# Patient Record
Sex: FEMALE
[De-identification: names, ages, dates, MRNs, and addresses within clinical notes are randomized per-mention and may not be internally consistent; named-entity substitution may affect disease eponyms.]

---

## 2018-02-09 ENCOUNTER — HOSPITAL ENCOUNTER (EMERGENCY)
Dept: HOSPITAL 31 - C.ER | Age: 78
End: 2018-02-09
Payer: MEDICARE

## 2018-02-09 VITALS — TEMPERATURE: 96 F

## 2018-02-09 VITALS — BODY MASS INDEX: 22.4 KG/M2

## 2018-02-09 DIAGNOSIS — M81.0: ICD-10-CM

## 2018-02-09 DIAGNOSIS — E86.0: ICD-10-CM

## 2018-02-09 DIAGNOSIS — T68.XXXA: ICD-10-CM

## 2018-02-09 DIAGNOSIS — E87.2: ICD-10-CM

## 2018-02-09 DIAGNOSIS — N17.9: ICD-10-CM

## 2018-02-09 DIAGNOSIS — J18.9: ICD-10-CM

## 2018-02-09 DIAGNOSIS — I10: ICD-10-CM

## 2018-02-09 DIAGNOSIS — A41.9: Primary | ICD-10-CM

## 2018-02-09 LAB
ALBUMIN SERPL-MCNC: 3 G/DL (ref 3.5–5)
ALBUMIN/GLOB SERPL: 0.9 {RATIO} (ref 1–2.1)
ALT SERPL-CCNC: 572 U/L (ref 9–52)
ANISOCYTOSIS BLD QL SMEAR: SLIGHT
APTT BLD: 41 SECONDS (ref 21–34)
ARTERIAL BLOOD GAS O2 SAT: 100.6 % (ref 95–98)
ARTERIAL BLOOD GAS PCO2: 27 MM/HG (ref 35–45)
ARTERIAL BLOOD GAS TCO2: 7.2 MMOL/L (ref 22–28)
AST SERPL-CCNC: 1030 U/L (ref 14–36)
BACTERIA #/AREA URNS HPF: (no result) /[HPF]
BASE EXCESS BLDV CALC-SCNC: -20 MMOL/L (ref 0–2)
BASOPHILS # BLD AUTO: 0 K/UL (ref 0–0.2)
BASOPHILS NFR BLD: 0.2 % (ref 0–2)
BILIRUB UR-MCNC: NEGATIVE MG/DL
BUN SERPL-MCNC: 62 MG/DL (ref 7–17)
BURR CELLS BLD QL SMEAR: SLIGHT
CALCIUM SERPL-MCNC: 9 MG/DL (ref 8.6–10.4)
CK MB SERPL-MCNC: 9.85 NG/ML (ref 0–3.38)
EOSINOPHIL # BLD AUTO: 0 K/UL (ref 0–0.7)
EOSINOPHIL NFR BLD: 0.1 % (ref 0–4)
ERYTHROCYTE [DISTWIDTH] IN BLOOD BY AUTOMATED COUNT: 17.5 % (ref 11.5–14.5)
GFR NON-AFRICAN AMERICAN: 16
GLUCOSE UR STRIP-MCNC: NORMAL MG/DL
HCO3 BLDA-SCNC: 6.2 MMOL/L (ref 21–28)
HGB BLD-MCNC: 11.7 G/DL (ref 11–16)
HYALINE CASTS #/AREA URNS LPF: (no result) /LPF (ref 0–2)
HYPOCHROMIC: SLIGHT
INHALED O2 CONCENTRATION: 100 %
INR PPP: 1.7
LEUKOCYTE ESTERASE UR-ACNC: (no result) LEU/UL
LG PLATELETS BLD QL SMEAR: PRESENT
LYMPHOCYTE: 8 % (ref 20–40)
LYMPHOCYTES # BLD AUTO: 0.4 K/UL (ref 1–4.3)
LYMPHOCYTES NFR BLD AUTO: 9.1 % (ref 20–40)
MCH RBC QN AUTO: 24.6 PG (ref 27–31)
MCHC RBC AUTO-ENTMCNC: 31.3 G/DL (ref 33–37)
MCV RBC AUTO: 78.5 FL (ref 81–99)
MONOCYTE: 5 % (ref 0–10)
MONOCYTES # BLD: 0 K/UL (ref 0–0.8)
MONOCYTES NFR BLD: 0.5 % (ref 0–10)
NEUTROPHILS # BLD: 4.3 K/UL (ref 1.8–7)
NEUTROPHILS NFR BLD AUTO: 35 % (ref 50–75)
NEUTROPHILS NFR BLD AUTO: 90.1 % (ref 50–75)
NEUTS BAND NFR BLD: 51 % (ref 0–2)
NRBC BLD AUTO-RTO: 0.3 % (ref 0–2)
OVALOCYTES BLD QL SMEAR: SLIGHT
PCO2 BLDV: 54 MMHG (ref 40–60)
PH BLDA: 6.98 [PH] (ref 7.35–7.45)
PH BLDV: 6.96 [PH] (ref 7.32–7.43)
PH UR STRIP: 5 [PH] (ref 5–8)
PLATELET # BLD EST: NORMAL 10*3/UL
PLATELET # BLD: 178 K/UL (ref 130–400)
PMV BLD AUTO: 9.8 FL (ref 7.2–11.7)
PO2 BLDA: 409 MM/HG (ref 80–100)
POIKILOCYTOSIS BLD QL SMEAR: SLIGHT
PROT UR STRIP-MCNC: (no result) MG/DL
PROTHROMBIN TIME: 18.9 SECONDS (ref 9.7–12.2)
RBC # BLD AUTO: 4.75 MIL/UL (ref 3.8–5.2)
RBC # UR STRIP: NEGATIVE /UL
SP GR UR STRIP: 1.02 (ref 1–1.03)
SQUAMOUS EPITHIAL: 1 /HPF (ref 0–5)
TEARDROP CELLS: SLIGHT
TOTAL CELLS COUNTED BLD: 100
TROPONIN I SERPL-MCNC: 0.21 NG/ML (ref 0–0.12)
URINE AMORPHOUS SEDIMENT: (no result) /UL
URINE NITRATE: NEGATIVE
UROBILINOGEN UR-MCNC: NORMAL MG/DL (ref 0.2–1)
VARIANT LYMPHS NFR BLD MANUAL: 1 % (ref 0–0)
VENOUS BLOOD GAS PO2: 47 MM/HG (ref 30–55)
WBC # BLD AUTO: 4.8 K/UL (ref 4.8–10.8)

## 2018-02-09 PROCEDURE — 83880 ASSAY OF NATRIURETIC PEPTIDE: CPT

## 2018-02-09 PROCEDURE — 84484 ASSAY OF TROPONIN QUANT: CPT

## 2018-02-09 PROCEDURE — 71045 X-RAY EXAM CHEST 1 VIEW: CPT

## 2018-02-09 PROCEDURE — 94660 CPAP INITIATION&MGMT: CPT

## 2018-02-09 PROCEDURE — 80053 COMPREHEN METABOLIC PANEL: CPT

## 2018-02-09 PROCEDURE — 92950 HEART/LUNG RESUSCITATION CPR: CPT

## 2018-02-09 PROCEDURE — 82803 BLOOD GASES ANY COMBINATION: CPT

## 2018-02-09 PROCEDURE — 85610 PROTHROMBIN TIME: CPT

## 2018-02-09 PROCEDURE — 82948 REAGENT STRIP/BLOOD GLUCOSE: CPT

## 2018-02-09 PROCEDURE — 36600 WITHDRAWAL OF ARTERIAL BLOOD: CPT

## 2018-02-09 PROCEDURE — 93005 ELECTROCARDIOGRAM TRACING: CPT

## 2018-02-09 PROCEDURE — 87040 BLOOD CULTURE FOR BACTERIA: CPT

## 2018-02-09 PROCEDURE — 99291 CRITICAL CARE FIRST HOUR: CPT

## 2018-02-09 PROCEDURE — 85025 COMPLETE CBC W/AUTO DIFF WBC: CPT

## 2018-02-09 PROCEDURE — 96361 HYDRATE IV INFUSION ADD-ON: CPT

## 2018-02-09 PROCEDURE — 85730 THROMBOPLASTIN TIME PARTIAL: CPT

## 2018-02-09 PROCEDURE — 96375 TX/PRO/DX INJ NEW DRUG ADDON: CPT

## 2018-02-09 PROCEDURE — 87086 URINE CULTURE/COLONY COUNT: CPT

## 2018-02-09 PROCEDURE — 81001 URINALYSIS AUTO W/SCOPE: CPT

## 2018-02-09 PROCEDURE — 96365 THER/PROPH/DIAG IV INF INIT: CPT

## 2018-02-09 NOTE — C.PDOC
History Of Present Illness


Pt was found lethargic in her bedroom. The room's condition is very poor 

according to EMS. EMS found pt to be hypoglycemic (FS 50) and was given D50 en 

route without response. 


Time Seen by Provider: 18 07:22


Chief Complaint (Nursing): Altered Mental Status


History Per: EMS


History/Exam Limitations: Clinical Condition


Onset/Duration Of Symptoms: Unknown


Onset Of Symptoms: Cannot Confirm Onset


Current Symptoms Are (Timing): Still Present


Usual Baseline: Unknown


Exacerbating Factor(s): Unknown


Severity: Severe


Additional History Per: Prior Records


Associated Symptoms: Weakness, Other (Lethargy)





Past Medical History


Reviewed: Historical Data, Nursing Documentation, Vital Signs


Vital Signs: 





 Last Vital Signs











Temp  95.5 F L  18 10:04


 


Pulse  133 H  18 10:04


 


Resp  26 H  18 10:04


 


BP  146/80   18 10:04


 


Pulse Ox  100   18 10:04














- Medical History


PMH: Asthma, HTN, Osteoporosis


Family History: States: Unknown Family Hx





- Social History


Hx Alcohol Use: No (unknown)


Hx Substance Use: No





- Immunization History


Hx Tetanus Toxoid Vaccination: No (unknown)


Hx Influenza Vaccination: No (unknown)


Hx Pneumococcal Vaccination: No ([place]nown)





Review Of Systems


Review Of Systems: ROS cannot be obtained secondary to pt's inabilty to answer 

questions.





Physical Exam





- Physical Exam


Appears: Toxic, In Acute Distress, Other (Lethargic)


Skin: Dry, Mottled


Head: Atraumatic


Oral Mucosa: Dry


Neck: Normal ROM, Supple


Cardiovascular: Rhythm Regular (tachycardia)


Respiratory: Rhonchi, Other (tachypnea)


Gastrointestinal/Abdominal: Soft, No Tenderness, No Distention


Extremity: Normal ROM, No Pedal Edema


Neurological/Psych: No Response To Commands


Pain Response: Withdraws With Pain





ED Course And Treatment





- Laboratory Results


Result Diagrams: 


 18 07:52





 18 07:52


Lab Interpretation: Abnormal


Interpretation Of Abnormal: Acidosis. Acute renal failure. Bandemia.


ECG: Interpreted By Me, Viewed By Me


ECG Rhythm: Nonspecific Changes


Interpretation Of ECG: Indeterminate rhythm due to many artifacts. Tachycardia.


Rate From EC


O2 Sat by Pulse Oximetry: 100 (on BiPAP)





- Radiology


CXR: Viewed By Me, Read By Radiologist


CXR Interpretation: Yes: Infiltrates (RLL pneumonia)


Progress Note: Pt was placed on BiPAP and Bear hugger upon arrival with modest 

improvement.





Progress





- Interventions


Interventions:: Observation, Intravenous fluid, Oxygen





- Medications Administered


Intravenous: Other (Abx., Sodium Bicarb.)





- Data Reviewed


Data Reviewed: Lab, Diagnostic imaging, EKG, Old records





- Patient Status


Patient status: Partially improved, Critical





- Critical Care


Citical Care: Excluding Proc Time


Critical Care Time: 75 minutes





- Continuity of Care


Discussed patient case with:: ED Nurse, On-call PMD-pt unassigned


Discussed pt. case with consultant/specialty: Pulmonary/Crit. Care





- Patient Plan


Patient Plan: Admission, ICU





Disposition


Discussed With : Sophy Foster


Comment: He accepted pt on his service.


Doctor Will See Patient In The: Hospital





- Disposition


Disposition: HOSPITALIZED


Disposition Time: 10:54


Condition: CRITICAL





- Clinical Impression


Clinical Impression: 


 Lactic acidosis, Hypothermia, Dehydration, Pneumonia, Sepsis, ARF (acute renal 

failure)

## 2018-02-09 NOTE — CP.PCM.DIS
Provider





- Provider


Date of Admission: 


02/09/18 11:01





Attending physician: 


Sophy Foster MD





Time Spent in preparation of Discharge (in minutes): 20





Hospital Course





- Lab Results


Lab Results: 


 Most Recent Lab Values











WBC  4.8 K/uL (4.8-10.8)   02/09/18  07:52    


 


RBC  4.75 Mil/uL (3.80-5.20)   02/09/18  07:52    


 


Hgb  11.7 g/dL (11.0-16.0)   02/09/18  07:52    


 


Hct  37.3 % (34.0-47.0)   02/09/18  07:52    


 


MCV  78.5 fL (81.0-99.0)  L  02/09/18  07:52    


 


MCH  24.6 pg (27.0-31.0)  L  02/09/18  07:52    


 


MCHC  31.3 g/dL (33.0-37.0)  L  02/09/18  07:52    


 


RDW  17.5 % (11.5-14.5)  H  02/09/18  07:52    


 


Plt Count  178 K/uL (130-400)   02/09/18  07:52    


 


MPV  9.8 fL (7.2-11.7)   02/09/18  07:52    


 


Neut % (Auto)  90.1 % (50.0-75.0)  H  02/09/18  07:52    


 


Lymph % (Auto)  9.1 % (20.0-40.0)  L  02/09/18  07:52    


 


Mono % (Auto)  0.5 % (0.0-10.0)   02/09/18  07:52    


 


Eos % (Auto)  0.1 % (0.0-4.0)   02/09/18  07:52    


 


Baso % (Auto)  0.2 % (0.0-2.0)   02/09/18  07:52    


 


Neut # (Auto)  4.3 K/uL (1.8-7.0)   02/09/18  07:52    


 


Lymph # (Auto)  0.4 K/uL (1.0-4.3)  L  02/09/18  07:52    


 


Mono # (Auto)  0.0 K/uL (0.0-0.8)   02/09/18  07:52    


 


Eos # (Auto)  0.0 K/uL (0.0-0.7)   02/09/18  07:52    


 


Baso # (Auto)  0.0 K/uL (0.0-0.2)   02/09/18  07:52    


 


Neutrophils % (Manual)  35 % (50-75)  L  02/09/18  07:52    


 


Band Neutrophils %  51 % (0-2)  H*  02/09/18  07:52    


 


Lymphocytes % (Manual)  8 % (20-40)  L  02/09/18  07:52    


 


Reactive Lymphs %  1 % (0-0)  H  02/09/18  07:52    


 


Monocytes % (Manual)  5 % (0-10)   02/09/18  07:52    


 


Platelet Estimate  Normal  (NORMAL)   02/09/18  07:52    


 


Large Platelets  Present   02/09/18  07:52    


 


Hypochromasia (manual)  Slight   02/09/18  07:52    


 


Poikilocytosis (manual  Slight   02/09/18  07:52    


 


Anisocytosis (manual)  Slight   02/09/18  07:52    


 


Tear Drop Cells  Slight   02/09/18  07:52    


 


Ovalocytes  Slight   02/09/18  07:52    


 


Reed Cells  Slight   02/09/18  07:52    


 


PT  18.9 SECONDS (9.7-12.2)  H  02/09/18  07:52    


 


INR  1.7   02/09/18  07:52    


 


APTT  41 SECONDS (21-34)  H  02/09/18  07:52    


 


Puncture Site  Lf   02/09/18  09:00    


 


pCO2  27 mm/Hg (35-45)  L  02/09/18  09:00    


 


pO2  409 mm/Hg ()  H  02/09/18  09:00    


 


HCO3  6.2 mmol/L (21-28)  L*  02/09/18  09:00    


 


ABG pH  6.98  (7.35-7.45)  L*  02/09/18  09:00    


 


ABG Total CO2  7.2 mmol/L (22-28)  L  02/09/18  09:00    


 


ABG O2 Saturation  100.6 % (95-98)  H  02/09/18  09:00    


 


ABG Base Excess  -24.1 mmol/L (-2.0-3.0)  L  02/09/18  09:00    


 


Juan Test  Na   02/09/18  09:00    


 


ABG Potassium  4.5 mmol/L (3.6-5.2)   02/09/18  09:00    


 


VBG pH  6.96  (7.32-7.43)  L*  02/09/18  08:10    


 


VBG pCO2  54 mmHg (40-60)   02/09/18  08:10    


 


VBG HCO3  8.1 mmol/L  02/09/18  08:10    


 


VBG Total CO2  13.8 mmol/L (22-28)  L  02/09/18  08:10    


 


VBG O2 Sat (Calc)  66.0 % (40-65)  H  02/09/18  08:10    


 


VBG Base Excess  -20.0 mmol/L (0.0-2.0)  L  02/09/18  08:10    


 


VBG Potassium  4.9 mmol/L (3.6-5.2)   02/09/18  08:10    


 


A-a O2 Difference  270.0 mm/Hg  02/09/18  09:00    


 


Respiratory Index  0.7   02/09/18  09:00    


 


Sodium  136.0 mmol/l (132-148)   02/09/18  09:00    


 


Chloride  108.0 mmol/L ()  H  02/09/18  09:00    


 


Glucose  67 mg/dl ()   02/09/18  09:00    


 


Lactate  12.9 mmol/L (0.7-2.1)  H*  02/09/18  09:00    


 


FiO2  100.0 %  02/09/18  09:00    


 


Inspiratory BiPAP  12   02/09/18  09:00    


 


Expiratory BiPAP  6   02/09/18  09:00    


 


Crit Value Called To  Dr velasco   02/09/18  09:00    


 


Crit Value Called By  Amando babcock   02/09/18  09:00    


 


Crit Value Read Back  Y   02/09/18  09:00    


 


Blood Gas Notified Time  910   02/09/18  09:00    


 


Sodium  130 mmol/L (132-148)  L  02/09/18  07:52    


 


Potassium  5.2 mmol/L (3.6-5.2)   02/09/18  07:52    


 


Chloride  95 mmol/L ()  L  02/09/18  07:52    


 


Carbon Dioxide  9 mmol/L (22-30)  L*  02/09/18  07:52    


 


Anion Gap  30  (10-20)  H  02/09/18  07:52    


 


BUN  62 mg/dL (7-17)  H  02/09/18  07:52    


 


Creatinine  2.9 mg/dL (0.7-1.2)  H  02/09/18  07:52    


 


Est GFR ( Amer)  19   02/09/18  07:52    


 


Est GFR (Non-Af Amer)  16   02/09/18  07:52    


 


POC Glucose (mg/dL)  138 mg/dL ()  H  02/09/18  07:30    


 


Random Glucose  168 mg/dL ()  H  02/09/18  07:52    


 


Calcium  9.0 mg/dl (8.6-10.4)   02/09/18  07:52    


 


Total Bilirubin  1.7 mg/dL (0.2-1.3)  H  02/09/18  07:52    


 


AST  1030 U/L (14-36)  H  02/09/18  07:52    


 


ALT  572 U/L (9-52)  H  02/09/18  07:52    


 


Alkaline Phosphatase  45 U/L ()   02/09/18  07:52    


 


Total Creatine Kinase  542 U/L ()  H  02/09/18  07:52    


 


CK-MB (Mass)  9.85 ng/mL (0.0-3.38)  H  02/09/18  07:52    


 


Troponin I  0.2070 ng/mL (0.00-0.120)  H*  02/09/18  07:52    


 


NT-Pro-B Natriuret Pep  12838 pg/mL (0-900)  H  02/09/18  07:52    


 


Total Protein  6.5 g/dL (6.3-8.3)   02/09/18  07:52    


 


Albumin  3.0 g/dL (3.5-5.0)  L  02/09/18  07:52    


 


Globulin  3.5 gm/dL (2.2-3.9)   02/09/18  07:52    


 


Albumin/Globulin Ratio  0.9  (1.0-2.1)  L  02/09/18  07:52    


 


Arterial Blood Potassium  4.5 mmol/L (3.6-5.2)   02/09/18  09:00    


 


Venous Blood Potassium  4.9 mmol/L (3.6-5.2)   02/09/18  08:10    


 


Urine Color  Day  (YELLOW)   02/09/18  07:52    


 


Urine Clarity  Hazy  (Clear)   02/09/18  07:52    


 


Urine pH  5.0  (5.0-8.0)   02/09/18  07:52    


 


Ur Specific Gravity  1.023  (1.003-1.030)   02/09/18  07:52    


 


Urine Protein  2+ mg/dL (NEGATIVE)  H  02/09/18  07:52    


 


Urine Glucose (UA)  Normal mg/dL (Normal)   02/09/18  07:52    


 


Urine Ketones  Negative mg/dL (NEGATIVE)   02/09/18  07:52    


 


Urine Blood  Negative  (NEGATIVE)   02/09/18  07:52    


 


Urine Nitrate  Negative  (NEGATIVE)   02/09/18  07:52    


 


Urine Bilirubin  Negative  (NEGATIVE)   02/09/18  07:52    


 


Urine Urobilinogen  Normal mg/dL (0.2-1.0)   02/09/18  07:52    


 


Ur Leukocyte Esterase  Neg Kannan/uL (Negative)   02/09/18  07:52    


 


Urine WBC (Auto)  3 /hpf (0-5)   02/09/18  07:52    


 


Urine RBC (Auto)  2 /hpf (0-3)   02/09/18  07:52    


 


Ur Squamous Epith Cells  1 /hpf (0-5)   02/09/18  07:52    


 


Amorphous Sediment  Occ /ul (<OCC)  H  02/09/18  07:52    


 


Urine Bacteria  Few  (<OCC)  H  02/09/18  07:52    


 


Hyaline Casts  6-10 /lpf (0-2)  H  02/09/18  07:52    














- Hospital Course


Hospital Course: 





PT WAS FOUND ON THE FLOOR WITH DISORIENTATION  COVERED WITH HUMAN WASTE 


PT HAD GLOCOSE OF 50 


PT WAS IN RENAL FAILURE AND SEVERELY ACIDOTIC 


BEFORE ANY FURTHER TESTS COULD BE DONE , PT CODED IN CT SCAN AND WAS UNABLE TO 

REVIVE 


PT WAS PRONOUNCED DEAD BY HOUSE MD 





PT AS PER CHART HAS H/O COPD/OSTEOPOROSIS 





Discharge Plan





- Follow Up Plan


Condition: CRITICAL


Disposition: HOME/ ROUTINE

## 2018-02-09 NOTE — CP.PCM.CON
<Abadier,Michael - Last Filed: 02/09/18 11:29>





Past Patient History





- Past Social History


Smoking Status: Unknown If Ever Smoked





- CARDIAC


Hx Hypertension: Yes





- PULMONARY


Hx Asthma: Yes





- MUSCULOSKELETAL/RHEUMATOLOGICAL


Hx Osteoporosis: Yes





- PSYCHIATRIC


Hx Substance Use: No





- SURGICAL HISTORY


Hx Surgeries:  (unobtainable)





Meds


Allergies/Adverse Reactions: 


 Allergies











Allergy/AdvReac Type Severity Reaction Status Date / Time


 


Unobtainable Allergy   Verified 02/09/18 07:17














- Medications


Medications: 


 Current Medications





Albuterol/Ipratropium (Duoneb 3 Mg/0.5 Mg (3 Ml) Ud)  3 ml INH RQ6 ANTHONY


Sodium Chloride (Sodium Chloride 0.9%)  1,000 mls @ 75 mls/hr IV .[id number] ANTHONY


Piperacillin Sod/Tazobactam Sod (Zosyn 3.375 In Ns 100ml)  100 mls @ 200 mls/hr 

IVPB ONCE ONE


   Stop: 02/09/18 11:50


Vancomycin/Sodium Chloride (Vancomycin 1 Gm/Ns 200 Ml)  1 gm in 200 mls @ 133 

mls/hr IVPB DAILY ANTHONY


   Stop: 02/15/18 10:01


Sodium Bicarbonate 75 meq/ (Sodium Chloride)  1,075 mls @ 75 mls/hr IV .[id number] 

ANTHONY











Results





- Vital Signs


Recent Vital Signs: 


 Last Vital Signs











Temp  95.5 F L  02/09/18 10:04


 


Pulse  133 H  02/09/18 10:04


 


Resp  26 H  02/09/18 10:04


 


BP  146/80   02/09/18 10:04


 


Pulse Ox  100   02/09/18 11:00














- Labs


Result Diagrams: 


 02/09/18 07:52





 02/09/18 07:52


Labs: 


 Laboratory Results - last 24 hr











  02/09/18 02/09/18 02/09/18





  07:30 07:52 07:52


 


WBC    4.8


 


RBC    4.75


 


Hgb    11.7


 


Hct    37.3


 


MCV    78.5 L


 


MCH    24.6 L


 


MCHC    31.3 L


 


RDW    17.5 H


 


Plt Count    178


 


MPV    9.8


 


Neut % (Auto)    90.1 H


 


Lymph % (Auto)    9.1 L


 


Mono % (Auto)    0.5


 


Eos % (Auto)    0.1


 


Baso % (Auto)    0.2


 


Neut # (Auto)    4.3


 


Lymph # (Auto)    0.4 L


 


Mono # (Auto)    0.0


 


Eos # (Auto)    0.0


 


Baso # (Auto)    0.0


 


Neutrophils % (Manual)    35 L


 


Band Neutrophils %    51 H*


 


Lymphocytes % (Manual)    8 L


 


Reactive Lymphs %    1 H


 


Monocytes % (Manual)    5


 


Platelet Estimate    Normal


 


Large Platelets    Present


 


Hypochromasia (manual)    Slight


 


Poikilocytosis (manual    Slight


 


Anisocytosis (manual)    Slight


 


Tear Drop Cells    Slight


 


Ovalocytes    Slight


 


Reed Cells    Slight


 


PT   


 


INR   


 


APTT   


 


Puncture Site   


 


pCO2   


 


pO2   


 


HCO3   


 


ABG pH   


 


ABG Total CO2   


 


ABG O2 Saturation   


 


ABG Base Excess   


 


Juan Test   


 


ABG Potassium   


 


VBG pH   


 


VBG pCO2   


 


VBG HCO3   


 


VBG Total CO2   


 


VBG O2 Sat (Calc)   


 


VBG Base Excess   


 


VBG Potassium   


 


A-a O2 Difference   


 


Respiratory Index   


 


Glucose   


 


Lactate   


 


FiO2   


 


Inspiratory BiPAP   


 


Expiratory BiPAP   


 


Crit Value Called To   


 


Crit Value Called By   


 


Crit Value Read Back   


 


Blood Gas Notified Time   


 


Sodium   130 L 


 


Potassium   5.2 


 


Chloride   95 L 


 


Carbon Dioxide   9 L* 


 


Anion Gap   30 H 


 


BUN   62 H 


 


Creatinine   2.9 H 


 


Est GFR ( Amer)   19 


 


Est GFR (Non-Af Amer)   16 


 


POC Glucose (mg/dL)  138 H  


 


Random Glucose   168 H 


 


Calcium   9.0 


 


Total Bilirubin   1.7 H 


 


AST   1030 H 


 


ALT   572 H 


 


Alkaline Phosphatase   45 


 


Total Creatine Kinase   542 H 


 


CK-MB (Mass)   9.85 H 


 


Troponin I   0.2070 H* 


 


NT-Pro-B Natriuret Pep   03779 H 


 


Total Protein   6.5 


 


Albumin   3.0 L 


 


Globulin   3.5 


 


Albumin/Globulin Ratio   0.9 L 


 


Arterial Blood Potassium   


 


Venous Blood Potassium   


 


Urine Color   


 


Urine Clarity   


 


Urine pH   


 


Ur Specific Gravity   


 


Urine Protein   


 


Urine Glucose (UA)   


 


Urine Ketones   


 


Urine Blood   


 


Urine Nitrate   


 


Urine Bilirubin   


 


Urine Urobilinogen   


 


Ur Leukocyte Esterase   


 


Urine WBC (Auto)   


 


Urine RBC (Auto)   


 


Ur Squamous Epith Cells   


 


Amorphous Sediment   


 


Urine Bacteria   


 


Hyaline Casts   














  02/09/18 02/09/18 02/09/18





  07:52 07:52 08:10


 


WBC   


 


RBC   


 


Hgb   


 


Hct   


 


MCV   


 


MCH   


 


MCHC   


 


RDW   


 


Plt Count   


 


MPV   


 


Neut % (Auto)   


 


Lymph % (Auto)   


 


Mono % (Auto)   


 


Eos % (Auto)   


 


Baso % (Auto)   


 


Neut # (Auto)   


 


Lymph # (Auto)   


 


Mono # (Auto)   


 


Eos # (Auto)   


 


Baso # (Auto)   


 


Neutrophils % (Manual)   


 


Band Neutrophils %   


 


Lymphocytes % (Manual)   


 


Reactive Lymphs %   


 


Monocytes % (Manual)   


 


Platelet Estimate   


 


Large Platelets   


 


Hypochromasia (manual)   


 


Poikilocytosis (manual   


 


Anisocytosis (manual)   


 


Tear Drop Cells   


 


Ovalocytes   


 


Closter Cells   


 


PT  18.9 H  


 


INR  1.7  


 


APTT  41 H  


 


Puncture Site   


 


pCO2   


 


pO2    47


 


HCO3   


 


ABG pH   


 


ABG Total CO2   


 


ABG O2 Saturation   


 


ABG Base Excess   


 


Juan Test   


 


ABG Potassium   


 


VBG pH    6.96 L*


 


VBG pCO2    54


 


VBG HCO3    8.1


 


VBG Total CO2    13.8 L


 


VBG O2 Sat (Calc)    66.0 H


 


VBG Base Excess    -20.0 L


 


VBG Potassium    4.9


 


A-a O2 Difference   


 


Respiratory Index   


 


Glucose    168 H


 


Lactate    13.6 H*


 


FiO2   


 


Inspiratory BiPAP   


 


Expiratory BiPAP   


 


Crit Value Called To    Dr spaulding


 


Crit Value Called By    Marii babcock [facility]


 


Crit Value Read Back    Y


 


Blood Gas Notified Time    820


 


Sodium    132.0


 


Potassium   


 


Chloride    98.0


 


Carbon Dioxide   


 


Anion Gap   


 


BUN   


 


Creatinine   


 


Est GFR ( Amer)   


 


Est GFR (Non-Af Amer)   


 


POC Glucose (mg/dL)   


 


Random Glucose   


 


Calcium   


 


Total Bilirubin   


 


AST   


 


ALT   


 


Alkaline Phosphatase   


 


Total Creatine Kinase   


 


CK-MB (Mass)   


 


Troponin I   


 


NT-Pro-B Natriuret Pep   


 


Total Protein   


 


Albumin   


 


Globulin   


 


Albumin/Globulin Ratio   


 


Arterial Blood Potassium   


 


Venous Blood Potassium    4.9


 


Urine Color   Day 


 


Urine Clarity   Hazy 


 


Urine pH   5.0 


 


Ur Specific Gravity   1.023 


 


Urine Protein   2+ H 


 


Urine Glucose (UA)   Normal 


 


Urine Ketones   Negative 


 


Urine Blood   Negative 


 


Urine Nitrate   Negative 


 


Urine Bilirubin   Negative 


 


Urine Urobilinogen   Normal 


 


Ur Leukocyte Esterase   Neg 


 


Urine WBC (Auto)   3 


 


Urine RBC (Auto)   2 


 


Ur Squamous Epith Cells   1 


 


Amorphous Sediment   Occ H 


 


Urine Bacteria   Few H 


 


Hyaline Casts   6-10 H 














  02/09/18





  09:00


 


WBC 


 


RBC 


 


Hgb 


 


Hct 


 


MCV 


 


MCH 


 


MCHC 


 


RDW 


 


Plt Count 


 


MPV 


 


Neut % (Auto) 


 


Lymph % (Auto) 


 


Mono % (Auto) 


 


Eos % (Auto) 


 


Baso % (Auto) 


 


Neut # (Auto) 


 


Lymph # (Auto) 


 


Mono # (Auto) 


 


Eos # (Auto) 


 


Baso # (Auto) 


 


Neutrophils % (Manual) 


 


Band Neutrophils % 


 


Lymphocytes % (Manual) 


 


Reactive Lymphs % 


 


Monocytes % (Manual) 


 


Platelet Estimate 


 


Large Platelets 


 


Hypochromasia (manual) 


 


Poikilocytosis (manual 


 


Anisocytosis (manual) 


 


Tear Drop Cells 


 


Ovalocytes 


 


Reed Cells 


 


PT 


 


INR 


 


APTT 


 


Puncture Site  Lf


 


pCO2  27 L


 


pO2  409 H


 


HCO3  6.2 L*


 


ABG pH  6.98 L*


 


ABG Total CO2  7.2 L


 


ABG O2 Saturation  100.6 H


 


ABG Base Excess  -24.1 L


 


Juan Test  Na


 


ABG Potassium  4.5


 


VBG pH 


 


VBG pCO2 


 


VBG HCO3 


 


VBG Total CO2 


 


VBG O2 Sat (Calc) 


 


VBG Base Excess 


 


VBG Potassium 


 


A-a O2 Difference  270.0


 


Respiratory Index  0.7


 


Glucose  67


 


Lactate  12.9 H*


 


FiO2  100.0


 


Inspiratory BiPAP  12


 


Expiratory BiPAP  6


 


Crit Value Called To  Dr velasco


 


Crit Value Called By  Amando babcock


 


Crit Value Read Back  Y


 


Blood Gas Notified Time  910


 


Sodium  136.0


 


Potassium 


 


Chloride  108.0 H


 


Carbon Dioxide 


 


Anion Gap 


 


BUN 


 


Creatinine 


 


Est GFR ( Amer) 


 


Est GFR (Non-Af Amer) 


 


POC Glucose (mg/dL) 


 


Random Glucose 


 


Calcium 


 


Total Bilirubin 


 


AST 


 


ALT 


 


Alkaline Phosphatase 


 


Total Creatine Kinase 


 


CK-MB (Mass) 


 


Troponin I 


 


NT-Pro-B Natriuret Pep 


 


Total Protein 


 


Albumin 


 


Globulin 


 


Albumin/Globulin Ratio 


 


Arterial Blood Potassium  4.5


 


Venous Blood Potassium 


 


Urine Color 


 


Urine Clarity 


 


Urine pH 


 


Ur Specific Gravity 


 


Urine Protein 


 


Urine Glucose (UA) 


 


Urine Ketones 


 


Urine Blood 


 


Urine Nitrate 


 


Urine Bilirubin 


 


Urine Urobilinogen 


 


Ur Leukocyte Esterase 


 


Urine WBC (Auto) 


 


Urine RBC (Auto) 


 


Ur Squamous Epith Cells 


 


Amorphous Sediment 


 


Urine Bacteria 


 


Hyaline Casts 














<Ronald Seo - Last Filed: 02/09/18 13:02>





History of Present Illness





- History of Present Illness


History of Present Illness: 


76 y/o female with pmx of smoking presents to [facility] with AMS. AS per 

EMS, house/room was disheveled and a cat was found dead. Patient's sister at 

bedside who lives with patient was not able to provide any history. Patient's 

neice at bedside also not able to provide any history. Limited history. Patient 

seen at bedside on bi-pap with good ventilation, Utox not available.


Patient's telemetry lead not attached, not on pulse oximetry.


-BP(recorded) likely not accurate








Meds





- Medications


Medications: 


 Current Medications





Albuterol/Ipratropium (Duoneb 3 Mg/0.5 Mg (3 Ml) Ud)  3 ml INH RQ6 ANTHONY


Sodium Chloride (Sodium Chloride 0.9%)  1,000 mls @ 75 mls/hr IV .[id number] ANTHONY


Vancomycin/Sodium Chloride (Vancomycin 1 Gm/Ns 200 Ml)  1 gm in 200 mls @ 133 

mls/hr IVPB DAILY ANTHONY


   Stop: 02/15/18 10:01


Sodium Bicarbonate 75 meq/ (Sodium Chloride)  1,000 mls @ 75 mls/hr IV .[id number] 

ANTHONY











Results





- Vital Signs


Recent Vital Signs: 


 Last Vital Signs











Temp  96 F L  02/09/18 11:30


 


Pulse  50 L  02/09/18 12:00


 


Resp  0 L  02/09/18 12:00


 


BP  0/0 L  02/09/18 12:00


 


Pulse Ox  0 L  02/09/18 12:00














- Labs


Result Diagrams: 


 02/09/18 07:52





 02/09/18 07:52


Labs: 


 Laboratory Results - last 24 hr











  02/09/18 02/09/18 02/09/18





  07:30 07:52 07:52


 


WBC    4.8


 


RBC    4.75


 


Hgb    11.7


 


Hct    37.3


 


MCV    78.5 L


 


MCH    24.6 L


 


MCHC    31.3 L


 


RDW    17.5 H


 


Plt Count    178


 


MPV    9.8


 


Neut % (Auto)    90.1 H


 


Lymph % (Auto)    9.1 L


 


Mono % (Auto)    0.5


 


Eos % (Auto)    0.1


 


Baso % (Auto)    0.2


 


Neut # (Auto)    4.3


 


Lymph # (Auto)    0.4 L


 


Mono # (Auto)    0.0


 


Eos # (Auto)    0.0


 


Baso # (Auto)    0.0


 


Neutrophils % (Manual)    35 L


 


Band Neutrophils %    51 H*


 


Lymphocytes % (Manual)    8 L


 


Reactive Lymphs %    1 H


 


Monocytes % (Manual)    5


 


Platelet Estimate    Normal


 


Large Platelets    Present


 


Hypochromasia (manual)    Slight


 


Poikilocytosis (manual    Slight


 


Anisocytosis (manual)    Slight


 


Tear Drop Cells    Slight


 


Ovalocytes    Slight


 


Closter Cells    Slight


 


PT   


 


INR   


 


APTT   


 


Puncture Site   


 


pCO2   


 


pO2   


 


HCO3   


 


ABG pH   


 


ABG Total CO2   


 


ABG O2 Saturation   


 


ABG Base Excess   


 


Juan Test   


 


ABG Potassium   


 


VBG pH   


 


VBG pCO2   


 


VBG HCO3   


 


VBG Total CO2   


 


VBG O2 Sat (Calc)   


 


VBG Base Excess   


 


VBG Potassium   


 


A-a O2 Difference   


 


Respiratory Index   


 


Glucose   


 


Lactate   


 


FiO2   


 


Inspiratory BiPAP   


 


Expiratory BiPAP   


 


Crit Value Called To   


 


Crit Value Called By   


 


Crit Value Read Back   


 


Blood Gas Notified Time   


 


Sodium   130 L 


 


Potassium   5.2 


 


Chloride   95 L 


 


Carbon Dioxide   9 L* 


 


Anion Gap   30 H 


 


BUN   62 H 


 


Creatinine   2.9 H 


 


Est GFR ( Amer)   19 


 


Est GFR (Non-Af Amer)   16 


 


POC Glucose (mg/dL)  138 H  


 


Random Glucose   168 H 


 


Calcium   9.0 


 


Total Bilirubin   1.7 H 


 


AST   1030 H 


 


ALT   572 H 


 


Alkaline Phosphatase   45 


 


Total Creatine Kinase   542 H 


 


CK-MB (Mass)   9.85 H 


 


Troponin I   0.2070 H* 


 


NT-Pro-B Natriuret Pep   05493 H 


 


Total Protein   6.5 


 


Albumin   3.0 L 


 


Globulin   3.5 


 


Albumin/Globulin Ratio   0.9 L 


 


Arterial Blood Potassium   


 


Venous Blood Potassium   


 


Urine Color   


 


Urine Clarity   


 


Urine pH   


 


Ur Specific Gravity   


 


Urine Protein   


 


Urine Glucose (UA)   


 


Urine Ketones   


 


Urine Blood   


 


Urine Nitrate   


 


Urine Bilirubin   


 


Urine Urobilinogen   


 


Ur Leukocyte Esterase   


 


Urine WBC (Auto)   


 


Urine RBC (Auto)   


 


Ur Squamous Epith Cells   


 


Amorphous Sediment   


 


Urine Bacteria   


 


Hyaline Casts   














  02/09/18 02/09/18 02/09/18





  07:52 07:52 08:10


 


WBC   


 


RBC   


 


Hgb   


 


Hct   


 


MCV   


 


MCH   


 


MCHC   


 


RDW   


 


Plt Count   


 


MPV   


 


Neut % (Auto)   


 


Lymph % (Auto)   


 


Mono % (Auto)   


 


Eos % (Auto)   


 


Baso % (Auto)   


 


Neut # (Auto)   


 


Lymph # (Auto)   


 


Mono # (Auto)   


 


Eos # (Auto)   


 


Baso # (Auto)   


 


Neutrophils % (Manual)   


 


Band Neutrophils %   


 


Lymphocytes % (Manual)   


 


Reactive Lymphs %   


 


Monocytes % (Manual)   


 


Platelet Estimate   


 


Large Platelets   


 


Hypochromasia (manual)   


 


Poikilocytosis (manual   


 


Anisocytosis (manual)   


 


Tear Drop Cells   


 


Ovalocytes   


 


Closter Cells   


 


PT  18.9 H  


 


INR  1.7  


 


APTT  41 H  


 


Puncture Site   


 


pCO2   


 


pO2    47


 


HCO3   


 


ABG pH   


 


ABG Total CO2   


 


ABG O2 Saturation   


 


ABG Base Excess   


 


Juan Test   


 


ABG Potassium   


 


VBG pH    6.96 L*


 


VBG pCO2    54


 


VBG HCO3    8.1


 


VBG Total CO2    13.8 L


 


VBG O2 Sat (Calc)    66.0 H


 


VBG Base Excess    -20.0 L


 


VBG Potassium    4.9


 


A-a O2 Difference   


 


Respiratory Index   


 


Glucose    168 H


 


Lactate    13.6 H*


 


FiO2   


 


Inspiratory BiPAP   


 


Expiratory BiPAP   


 


Crit Value Called To    Dr spaulding


 


Crit Value Called By    Marii babcock brittanie


 


Crit Value Read Back    Y


 


Blood Gas Notified Time    820


 


Sodium    132.0


 


Potassium   


 


Chloride    98.0


 


Carbon Dioxide   


 


Anion Gap   


 


BUN   


 


Creatinine   


 


Est GFR ( Amer)   


 


Est GFR (Non-Af Amer)   


 


POC Glucose (mg/dL)   


 


Random Glucose   


 


Calcium   


 


Total Bilirubin   


 


AST   


 


ALT   


 


Alkaline Phosphatase   


 


Total Creatine Kinase   


 


CK-MB (Mass)   


 


Troponin I   


 


NT-Pro-B Natriuret Pep   


 


Total Protein   


 


Albumin   


 


Globulin   


 


Albumin/Globulin Ratio   


 


Arterial Blood Potassium   


 


Venous Blood Potassium    4.9


 


Urine Color   Day 


 


Urine Clarity   Hazy 


 


Urine pH   5.0 


 


Ur Specific Gravity   1.023 


 


Urine Protein   2+ H 


 


Urine Glucose (UA)   Normal 


 


Urine Ketones   Negative 


 


Urine Blood   Negative 


 


Urine Nitrate   Negative 


 


Urine Bilirubin   Negative 


 


Urine Urobilinogen   Normal 


 


Ur Leukocyte Esterase   Neg 


 


Urine WBC (Auto)   3 


 


Urine RBC (Auto)   2 


 


Ur Squamous Epith Cells   1 


 


Amorphous Sediment   Occ H 


 


Urine Bacteria   Few H 


 


Hyaline Casts   6-10 H 














  02/09/18





  09:00


 


WBC 


 


RBC 


 


Hgb 


 


Hct 


 


MCV 


 


MCH 


 


MCHC 


 


RDW 


 


Plt Count 


 


MPV 


 


Neut % (Auto) 


 


Lymph % (Auto) 


 


Mono % (Auto) 


 


Eos % (Auto) 


 


Baso % (Auto) 


 


Neut # (Auto) 


 


Lymph # (Auto) 


 


Mono # (Auto) 


 


Eos # (Auto) 


 


Baso # (Auto) 


 


Neutrophils % (Manual) 


 


Band Neutrophils % 


 


Lymphocytes % (Manual) 


 


Reactive Lymphs % 


 


Monocytes % (Manual) 


 


Platelet Estimate 


 


Large Platelets 


 


Hypochromasia (manual) 


 


Poikilocytosis (manual 


 


Anisocytosis (manual) 


 


Tear Drop Cells 


 


Ovalocytes 


 


Reed Cells 


 


PT 


 


INR 


 


APTT 


 


Puncture Site  Lf


 


pCO2  27 L


 


pO2  409 H


 


HCO3  6.2 L*


 


ABG pH  6.98 L*


 


ABG Total CO2  7.2 L


 


ABG O2 Saturation  100.6 H


 


ABG Base Excess  -24.1 L


 


Juan Test  Na


 


ABG Potassium  4.5


 


VBG pH 


 


VBG pCO2 


 


VBG HCO3 


 


VBG Total CO2 


 


VBG O2 Sat (Calc) 


 


VBG Base Excess 


 


VBG Potassium 


 


A-a O2 Difference  270.0


 


Respiratory Index  0.7


 


Glucose  67


 


Lactate  12.9 H*


 


FiO2  100.0


 


Inspiratory BiPAP  12


 


Expiratory BiPAP  6


 


Crit Value Called To  Dr velasco


 


Crit Value Called By  Amando babcock


 


Crit Value Read Back  Y


 


Blood Gas Notified Time  910


 


Sodium  136.0


 


Potassium 


 


Chloride  108.0 H


 


Carbon Dioxide 


 


Anion Gap 


 


BUN 


 


Creatinine 


 


Est GFR ( Amer) 


 


Est GFR (Non-Af Amer) 


 


POC Glucose (mg/dL) 


 


Random Glucose 


 


Calcium 


 


Total Bilirubin 


 


AST 


 


ALT 


 


Alkaline Phosphatase 


 


Total Creatine Kinase 


 


CK-MB (Mass) 


 


Troponin I 


 


NT-Pro-B Natriuret Pep 


 


Total Protein 


 


Albumin 


 


Globulin 


 


Albumin/Globulin Ratio 


 


Arterial Blood Potassium  4.5


 


Venous Blood Potassium 


 


Urine Color 


 


Urine Clarity 


 


Urine pH 


 


Ur Specific Gravity 


 


Urine Protein 


 


Urine Glucose (UA) 


 


Urine Ketones 


 


Urine Blood 


 


Urine Nitrate 


 


Urine Bilirubin 


 


Urine Urobilinogen 


 


Ur Leukocyte Esterase 


 


Urine WBC (Auto) 


 


Urine RBC (Auto) 


 


Ur Squamous Epith Cells 


 


Amorphous Sediment 


 


Urine Bacteria 


 


Hyaline Casts 














Assessment & Plan





- Assessment and Plan (Free Text)


Assessment: 


-AMS: obtain utox, check blood glucose, obtain CT head r/oICH, cat found dead ? 

patient ingested a prescribed drug (benzo/opiod)





-Respiratory acidosis with metabolic acidosis: ER placed patient on bi-pap with 

improvement in pCO2, pushed 150 meq of bicarb to keep pH >7.2, repeat ABG, d/w 

family regarding any advance directive, repeat ABG, if pCo2 increasing, will 

intubate





-Sepsis: CXR reveals RLL infiltrate, start broad spectrum abx, vanco/zosyn/

azithro, serial lactic





-r/o NSTEMI: EKG not available, (+)trop, obtain echo, repeat Trops





-BGM q4hrs, ISS/aspart





-DVT ppx SCDS (obtain CT head if ICH neg then start SQ heparin





-PUD ppx pepcid





Patient has multi organ dysfunction with severe metabolic acidosis (2nd renal 

failure), severe COPD (with Co2 retention improving with bi-pap), heart failure 

(trop(+) at risk of CAD/NSTEMI), long history of smoking, which increases her 

mortality to more than 80%.





-Patient will benefit from ICU monitoring with telemetry, SPO2 and non-invasive 

blood pressure cuff.


-Repeat ABG, if not improvement, will intubate (if family agrees).





d/w ER physician


cc time 55 minutes








- Date & Time


Date: 02/09/18


Time: 13:02

## 2018-02-09 NOTE — RAD
HISTORY:

AMS  



COMPARISON:

None available. 



TECHNIQUE:

Chest, one view.



FINDINGS:





LUNGS:

Dense right middle and lower lobe and less consolidated left lower 

lobe patchy consolidations worrisome for pneumonia.  Interstitial 

prominence may reflect infection or edema.  Biapical pleural 

thickening.  Hyperinflation may be seen in the setting of COPD. 



PLEURA:

No significant pleural effusion identified. No definite pneumothorax .



CARDIOVASCULAR:

Cardiomegaly.  Dense atherosclerotic calcifications of the aorta.



OSSEOUS STRUCTURES:

 Osseous demineralization.  Degenerative changes.



VISUALIZED UPPER ABDOMEN:

Unremarkable.



OTHER FINDINGS:

None.



IMPRESSION:

Dense right middle and lower lobe and less consolidated left lower 

lobe patchy consolidations worrisome for pneumonia.  Interstitial 

prominence may reflect infection or edema.  Biapical pleural 

thickening.  Hyperinflation may be seen in the setting of COPD.  



Recommend follow-up to resolution.

## 2018-02-09 NOTE — PCM.ANES
Anesthesia Emergent Intubation





- Diagnosis


Working Diagnosis:: respiratory fialiure in cat scan





- Intubation Attempts


Previous Number of Intubation Attempts:: 1 (criocoid)


By:: Stefania Devlin CRNA





- Pre-Intubation Vital Signs


Blood Pressure: 0/0 (no bp on arrival)


Heart Rate: 50 (no pulse on arrival)


Respiratory Rate: 0 (apneic)


O2 Sat: 0 (none)


FIO2: 100


Oxygen Delivery Method: Mask


Level Of Consciousness: Comatose/Unresponsive





- Airway Management


Inhalation: No


Rapid Sequence: Yes


Cricoid Pressure: Yes


Possible Aspiration: No





- Intubation Devices


Jose A Forcepts Used: No


Glide Scope Used: No


Fiber Optic Scope: No





- Placement Confirmation


Breath Sounds Present & Equal Bilaterally: Yes


Gurgling Sounds Not Audible at Epigastrum: No


Positive EtCO2: Yes


Portable CXR: Yes


Recommendations: Ventilator





- Post-Intubation Vital Signs


Blood Pressure: 55/0


Heart Rate: 120


Respiratory Rate: 20


O2 Sat: 100

## 2018-02-09 NOTE — CP.PCM.HP
History of Present Illness





- History of Present Illness


History of Present Illness: 





PT WAS FOUND ON THE FLOOR WITH DISORIENTATION  COVERED WITH HUMAN WASTE 


PT HAD GLOCOSE OF 50 


PT WAS IN RENAL FAILURE AND SEVERELY ACIDOTIC 


BEFORE ANY FURTHER TESTS COULD BE DONE , PT CODED IN CT SCAN AND WAS UNABLE TO 

REVIVE 


PT WAS PRONOUNCED DEAD BY HOUSE MD 





PT AS PER CHART HAS H/O COPD/OSTEOPOROSIS 





Past Patient History





- Past Social History


Smoking Status: Unknown If Ever Smoked





- CARDIAC


Hx Hypertension: Yes





- PULMONARY


Hx Asthma: Yes





- MUSCULOSKELETAL/RHEUMATOLOGICAL


Hx Osteoporosis: Yes





- PSYCHIATRIC


Hx Substance Use: No





- SURGICAL HISTORY


Hx Surgeries:  (unobtainable)





Meds


Allergies/Adverse Reactions: 


 Allergies











Allergy/AdvReac Type Severity Reaction Status Date / Time


 


Unobtainable Allergy   Verified 02/09/18 07:17














Results





- Vital Signs


Recent Vital Signs: 





 Last Vital Signs











Temp  96 F L  02/09/18 11:30


 


Pulse  0 L  02/09/18 12:46


 


Resp  0 L  02/09/18 12:00


 


BP  0/0 L  02/09/18 12:00


 


Pulse Ox  0 L  02/09/18 12:00














- Labs


Result Diagrams: 


 02/09/18 07:52





 02/09/18 07:52


Labs: 





 Laboratory Results - last 24 hr











  02/09/18 02/09/18 02/09/18





  07:30 07:52 07:52


 


WBC    4.8


 


RBC    4.75


 


Hgb    11.7


 


Hct    37.3


 


MCV    78.5 L


 


MCH    24.6 L


 


MCHC    31.3 L


 


RDW    17.5 H


 


Plt Count    178


 


MPV    9.8


 


Neut % (Auto)    90.1 H


 


Lymph % (Auto)    9.1 L


 


Mono % (Auto)    0.5


 


Eos % (Auto)    0.1


 


Baso % (Auto)    0.2


 


Neut # (Auto)    4.3


 


Lymph # (Auto)    0.4 L


 


Mono # (Auto)    0.0


 


Eos # (Auto)    0.0


 


Baso # (Auto)    0.0


 


Neutrophils % (Manual)    35 L


 


Band Neutrophils %    51 H*


 


Lymphocytes % (Manual)    8 L


 


Reactive Lymphs %    1 H


 


Monocytes % (Manual)    5


 


Platelet Estimate    Normal


 


Large Platelets    Present


 


Hypochromasia (manual)    Slight


 


Poikilocytosis (manual    Slight


 


Anisocytosis (manual)    Slight


 


Tear Drop Cells    Slight


 


Ovalocytes    Slight


 


[place] Cells    Slight


 


PT   


 


INR   


 


APTT   


 


Puncture Site   


 


pCO2   


 


pO2   


 


HCO3   


 


ABG pH   


 


ABG Total CO2   


 


ABG O2 Saturation   


 


ABG Base Excess   


 


Juan Test   


 


ABG Potassium   


 


VBG pH   


 


VBG pCO2   


 


VBG HCO3   


 


VBG Total CO2   


 


VBG O2 Sat (Calc)   


 


VBG Base Excess   


 


VBG Potassium   


 


A-a O2 Difference   


 


Respiratory Index   


 


Glucose   


 


Lactate   


 


FiO2   


 


Inspiratory BiPAP   


 


Expiratory BiPAP   


 


Crit Value Called To   


 


Crit Value Called By   


 


Crit Value Read Back   


 


Blood Gas Notified Time   


 


Sodium   130 L 


 


Potassium   5.2 


 


Chloride   95 L 


 


Carbon Dioxide   9 L* 


 


Anion Gap   30 H 


 


BUN   62 H 


 


Creatinine   2.9 H 


 


Est GFR ( Amer)   19 


 


Est GFR (Non-Af Amer)   16 


 


POC Glucose (mg/dL)  138 H  


 


Random Glucose   168 H 


 


Calcium   9.0 


 


Total Bilirubin   1.7 H 


 


AST   1030 H 


 


ALT   572 H 


 


Alkaline Phosphatase   45 


 


Total Creatine Kinase   542 H 


 


CK-MB (Mass)   9.85 H 


 


Troponin I   0.2070 H* 


 


NT-Pro-B Natriuret Pep   63280 H 


 


Total Protein   6.5 


 


Albumin   3.0 L 


 


Globulin   3.5 


 


Albumin/Globulin Ratio   0.9 L 


 


Arterial Blood Potassium   


 


Venous Blood Potassium   


 


Urine Color   


 


Urine Clarity   


 


Urine pH   


 


Ur Specific Gravity   


 


Urine Protein   


 


Urine Glucose (UA)   


 


Urine Ketones   


 


Urine Blood   


 


Urine Nitrate   


 


Urine Bilirubin   


 


Urine Urobilinogen   


 


Ur Leukocyte Esterase   


 


Urine WBC (Auto)   


 


Urine RBC (Auto)   


 


Ur Squamous Epith Cells   


 


Amorphous Sediment   


 


Urine Bacteria   


 


Hyaline Casts   














  02/09/18 02/09/18 02/09/18





  07:52 07:52 08:10


 


WBC   


 


RBC   


 


Hgb   


 


Hct   


 


MCV   


 


MCH   


 


MCHC   


 


RDW   


 


Plt Count   


 


MPV   


 


Neut % (Auto)   


 


Lymph % (Auto)   


 


Mono % (Auto)   


 


Eos % (Auto)   


 


Baso % (Auto)   


 


Neut # (Auto)   


 


Lymph # (Auto)   


 


Mono # (Auto)   


 


Eos # (Auto)   


 


Baso # (Auto)   


 


Neutrophils % (Manual)   


 


Band Neutrophils %   


 


Lymphocytes % (Manual)   


 


Reactive Lymphs %   


 


Monocytes % (Manual)   


 


Platelet Estimate   


 


Large Platelets   


 


Hypochromasia (manual)   


 


Poikilocytosis (manual   


 


Anisocytosis (manual)   


 


Tear Drop Cells   


 


Ovalocytes   


 


Reed Cells   


 


PT  18.9 H  


 


INR  1.7  


 


APTT  41 H  


 


Puncture Site   


 


pCO2   


 


pO2    47


 


HCO3   


 


ABG pH   


 


ABG Total CO2   


 


ABG O2 Saturation   


 


ABG Base Excess   


 


Juan Test   


 


ABG Potassium   


 


VBG pH    6.96 L*


 


VBG pCO2    54


 


VBG HCO3    8.1


 


VBG Total CO2    13.8 L


 


VBG O2 Sat (Calc)    66.0 H


 


VBG Base Excess    -20.0 L


 


VBG Potassium    4.9


 


A-a O2 Difference   


 


Respiratory Index   


 


Glucose    168 H


 


Lactate    13.6 H*


 


FiO2   


 


Inspiratory BiPAP   


 


Expiratory BiPAP   


 


Crit Value Called To    Dr spaulding


 


Crit Value Called By    Marii babcock [facility]


 


Crit Value Read Back    Y


 


Blood Gas Notified Time    820


 


Sodium    132.0


 


Potassium   


 


Chloride    98.0


 


Carbon Dioxide   


 


Anion Gap   


 


BUN   


 


Creatinine   


 


Est GFR ( Amer)   


 


Est GFR (Non-Af Amer)   


 


POC Glucose (mg/dL)   


 


Random Glucose   


 


Calcium   


 


Total Bilirubin   


 


AST   


 


ALT   


 


Alkaline Phosphatase   


 


Total Creatine Kinase   


 


CK-MB (Mass)   


 


Troponin I   


 


NT-Pro-B Natriuret Pep   


 


Total Protein   


 


Albumin   


 


Globulin   


 


Albumin/Globulin Ratio   


 


Arterial Blood Potassium   


 


Venous Blood Potassium    4.9


 


Urine Color   Day 


 


Urine Clarity   Hazy 


 


Urine pH   5.0 


 


Ur Specific Gravity   1.023 


 


Urine Protein   2+ H 


 


Urine Glucose (UA)   Normal 


 


Urine Ketones   Negative 


 


Urine Blood   Negative 


 


Urine Nitrate   Negative 


 


Urine Bilirubin   Negative 


 


Urine Urobilinogen   Normal 


 


Ur Leukocyte Esterase   Neg 


 


Urine WBC (Auto)   3 


 


Urine RBC (Auto)   2 


 


Ur Squamous Epith Cells   1 


 


Amorphous Sediment   Occ H 


 


Urine Bacteria   Few H 


 


Hyaline Casts   6-10 H 














  02/09/18





  09:00


 


WBC 


 


RBC 


 


Hgb 


 


Hct 


 


MCV 


 


MCH 


 


MCHC 


 


RDW 


 


Plt Count 


 


MPV 


 


Neut % (Auto) 


 


Lymph % (Auto) 


 


Mono % (Auto) 


 


Eos % (Auto) 


 


Baso % (Auto) 


 


Neut # (Auto) 


 


Lymph # (Auto) 


 


Mono # (Auto) 


 


Eos # (Auto) 


 


Baso # (Auto) 


 


Neutrophils % (Manual) 


 


Band Neutrophils % 


 


Lymphocytes % (Manual) 


 


Reactive Lymphs % 


 


Monocytes % (Manual) 


 


Platelet Estimate 


 


Large Platelets 


 


Hypochromasia (manual) 


 


Poikilocytosis (manual 


 


Anisocytosis (manual) 


 


Tear Drop Cells 


 


Ovalocytes 


 


Reed Cells 


 


PT 


 


INR 


 


APTT 


 


Puncture Site  Lf


 


pCO2  27 L


 


pO2  409 H


 


HCO3  6.2 L*


 


ABG pH  6.98 L*


 


ABG Total CO2  7.2 L


 


ABG O2 Saturation  100.6 H


 


ABG Base Excess  -24.1 L


 


Juan Test  Na


 


ABG Potassium  4.5


 


VBG pH 


 


VBG pCO2 


 


VBG HCO3 


 


VBG Total CO2 


 


VBG O2 Sat (Calc) 


 


VBG Base Excess 


 


VBG Potassium 


 


A-a O2 Difference  270.0


 


Respiratory Index  0.7


 


Glucose  67


 


Lactate  12.9 H*


 


FiO2  100.0


 


Inspiratory BiPAP  12


 


Expiratory BiPAP  6


 


Crit Value Called To  Dr velasco


 


Crit Value Called By  Amando babcock


 


Crit Value Read Back  Y


 


Blood Gas Notified Time  910


 


Sodium  136.0


 


Potassium 


 


Chloride  108.0 H


 


Carbon Dioxide 


 


Anion Gap 


 


BUN 


 


Creatinine 


 


Est GFR ( Amer) 


 


Est GFR (Non-Af Amer) 


 


POC Glucose (mg/dL) 


 


Random Glucose 


 


Calcium 


 


Total Bilirubin 


 


AST 


 


ALT 


 


Alkaline Phosphatase 


 


Total Creatine Kinase 


 


CK-MB (Mass) 


 


Troponin I 


 


NT-Pro-B Natriuret Pep 


 


Total Protein 


 


Albumin 


 


Globulin 


 


Albumin/Globulin Ratio 


 


Arterial Blood Potassium  4.5


 


Venous Blood Potassium 


 


Urine Color 


 


Urine Clarity 


 


Urine pH 


 


Ur Specific Gravity 


 


Urine Protein 


 


Urine Glucose (UA) 


 


Urine Ketones 


 


Urine Blood 


 


Urine Nitrate 


 


Urine Bilirubin 


 


Urine Urobilinogen 


 


Ur Leukocyte Esterase 


 


Urine WBC (Auto) 


 


Urine RBC (Auto) 


 


Ur Squamous Epith Cells 


 


Amorphous Sediment 


 


Urine Bacteria 


 


Hyaline Casts

## 2018-02-12 NOTE — CARD
--------------- APPROVED REPORT --------------





EKG Measurement

Heart [id number]

[id number]

[id number]

[id number]

[id number]



<Conclusion>

Sinus tachycardia with occasional premature ventricular complexes

Left ventricular hypertrophy with QRS widening and repolarization 

abnormality

Abnormal ECG